# Patient Record
Sex: FEMALE | Race: WHITE | URBAN - METROPOLITAN AREA
[De-identification: names, ages, dates, MRNs, and addresses within clinical notes are randomized per-mention and may not be internally consistent; named-entity substitution may affect disease eponyms.]

---

## 2022-02-22 ENCOUNTER — OFFICE VISIT (OUTPATIENT)
Dept: URBAN - METROPOLITAN AREA TELEHEALTH 2 | Facility: TELEHEALTH | Age: 67
End: 2022-02-22
Payer: MEDICARE

## 2022-02-22 DIAGNOSIS — R19.5 LOOSE STOOLS: ICD-10-CM

## 2022-02-22 PROCEDURE — 99204 OFFICE O/P NEW MOD 45 MIN: CPT | Performed by: NURSE PRACTITIONER

## 2022-02-22 RX ORDER — NITROFURANTOIN (MONOHYDRATE/MACROCRYSTALS) 75; 25 MG/1; MG/1
NULL DIAGNOSIS UNAVAILABLE CAPSULE ORAL
Qty: 10 | Refills: 0 | Status: ACTIVE | COMMUNITY

## 2022-02-22 RX ORDER — CIPROFLOXACIN 500 MG/1
TABLET, FILM COATED ORAL
Qty: 14 | Refills: 0 | Status: ACTIVE | COMMUNITY

## 2022-02-22 RX ORDER — OMEPRAZOLE 40 MG/1
CAPSULE, DELAYED RELEASE ORAL
Qty: 90 | Status: ACTIVE | COMMUNITY

## 2022-02-22 RX ORDER — CEFDINIR 300 MG/1
CAPSULE ORAL
Qty: 10 | Status: ACTIVE | COMMUNITY

## 2022-02-22 RX ORDER — LEVOTHYROXINE SODIUM 75 UG/1
TABLET ORAL
Qty: 90 | Status: ACTIVE | COMMUNITY

## 2022-02-22 RX ORDER — AMLODIPINE BESYLATE 10 MG/1
TABLET ORAL
Qty: 90 | Refills: 0 | Status: ACTIVE | COMMUNITY

## 2022-02-22 RX ORDER — CELECOXIB 200 MG/1
CAPSULE ORAL
Qty: 90 | Status: ACTIVE | COMMUNITY

## 2022-02-22 RX ORDER — ALBUTEROL SULFATE 90 UG/1
INHALE 2 PUFFS INTO THE LUNGS EVERY 4 HOURS AS NEEDED FOR MUSCLE SPASMS DIAGNOSIS UNAVAILABLE AEROSOL, METERED RESPIRATORY (INHALATION)
Qty: 8.5 | Refills: 0 | Status: ACTIVE | COMMUNITY

## 2022-02-22 RX ORDER — ONDANSETRON 4 MG/1
TABLET, ORALLY DISINTEGRATING ORAL
Qty: 20 | Status: ACTIVE | COMMUNITY

## 2022-02-22 RX ORDER — COLESTIPOL HYDROCHLORIDE 1 G/1
2 TABLETS TABLET ORAL ONCE A DAY
Qty: 60 | OUTPATIENT
Start: 2022-02-22

## 2022-02-22 NOTE — HPI-TODAY'S VISIT:
Audrey is a 66-year-old female who presents with loose stool.  She states for the last 2 years, she has had issues with loose stool.  Does still have her gallbladder.  She states she has frequent UTIs since having her hysterectomy.  Every time she gets put on antibiotics for these, her loose stool resolves.  In time she is off antibiotics her loose stool returns.   for the most part, she has been on antibiotics about once a month for her UTIs.  She is seen Dr. Milligan in the past and sounds like there is some report of some possible pelvic floor dysfunction and she is undergoing physical therapy for this.  No pain.  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  Otherwise, she is healthy. Sb

## 2022-02-26 ENCOUNTER — LAB OUTSIDE AN ENCOUNTER (OUTPATIENT)
Dept: URBAN - NONMETROPOLITAN AREA CLINIC 2 | Facility: CLINIC | Age: 67
End: 2022-02-26

## 2022-02-28 ENCOUNTER — TELEPHONE ENCOUNTER (OUTPATIENT)
Dept: URBAN - METROPOLITAN AREA CLINIC 92 | Facility: CLINIC | Age: 67
End: 2022-02-28

## 2022-02-28 RX ORDER — COLESTIPOL HYDROCHLORIDE 1 G/1
2 TABLETS TABLET, FILM COATED ORAL ONCE A DAY
Qty: 60 | OUTPATIENT
Start: 2022-02-28

## 2022-03-03 LAB
CALPROTECTIN, STOOL - QDX: (no result)
FECAL FAT, QUALITATIVE: (no result)
GASTROINTESTINAL PATHOGEN: (no result)
PANCREATICELASTASE ELISA, STOOL: (no result)

## 2022-03-04 ENCOUNTER — TELEPHONE ENCOUNTER (OUTPATIENT)
Dept: URBAN - METROPOLITAN AREA CLINIC 92 | Facility: CLINIC | Age: 67
End: 2022-03-04

## 2022-03-18 ENCOUNTER — OFFICE VISIT (OUTPATIENT)
Dept: URBAN - NONMETROPOLITAN AREA CLINIC 2 | Facility: CLINIC | Age: 67
End: 2022-03-18

## 2022-04-21 ENCOUNTER — OFFICE VISIT (OUTPATIENT)
Dept: URBAN - NONMETROPOLITAN AREA CLINIC 2 | Facility: CLINIC | Age: 67
End: 2022-04-21

## 2022-04-24 ENCOUNTER — WEB ENCOUNTER (OUTPATIENT)
Dept: URBAN - NONMETROPOLITAN AREA CLINIC 13 | Facility: CLINIC | Age: 67
End: 2022-04-24

## 2022-04-25 ENCOUNTER — TELEPHONE ENCOUNTER (OUTPATIENT)
Dept: URBAN - METROPOLITAN AREA CLINIC 82 | Facility: CLINIC | Age: 67
End: 2022-04-25

## 2022-04-25 ENCOUNTER — OFFICE VISIT (OUTPATIENT)
Dept: URBAN - NONMETROPOLITAN AREA CLINIC 13 | Facility: CLINIC | Age: 67
End: 2022-04-25
Payer: MEDICARE

## 2022-04-25 VITALS
TEMPERATURE: 97.7 F | BODY MASS INDEX: 23.05 KG/M2 | SYSTOLIC BLOOD PRESSURE: 145 MMHG | DIASTOLIC BLOOD PRESSURE: 83 MMHG | HEIGHT: 64 IN | WEIGHT: 135 LBS | HEART RATE: 85 BPM

## 2022-04-25 DIAGNOSIS — R19.5 LOOSE STOOLS: ICD-10-CM

## 2022-04-25 DIAGNOSIS — K86.81 EXOCRINE PANCREATIC INSUFFICIENCY: ICD-10-CM

## 2022-04-25 PROCEDURE — 99213 OFFICE O/P EST LOW 20 MIN: CPT | Performed by: NURSE PRACTITIONER

## 2022-04-25 RX ORDER — NITROFURANTOIN (MONOHYDRATE/MACROCRYSTALS) 75; 25 MG/1; MG/1
NULL DIAGNOSIS UNAVAILABLE CAPSULE ORAL
Qty: 10 | Refills: 0 | Status: ACTIVE | COMMUNITY

## 2022-04-25 RX ORDER — LEVOTHYROXINE SODIUM 75 UG/1
TABLET ORAL
Qty: 90 | Status: ACTIVE | COMMUNITY

## 2022-04-25 RX ORDER — CELECOXIB 200 MG/1
CAPSULE ORAL
Qty: 90 | Status: ACTIVE | COMMUNITY

## 2022-04-25 RX ORDER — CIPROFLOXACIN 500 MG/1
TABLET, FILM COATED ORAL
Qty: 14 | Refills: 0 | Status: ACTIVE | COMMUNITY

## 2022-04-25 RX ORDER — ALBUTEROL SULFATE 90 UG/1
INHALE 2 PUFFS INTO THE LUNGS EVERY 4 HOURS AS NEEDED FOR MUSCLE SPASMS DIAGNOSIS UNAVAILABLE AEROSOL, METERED RESPIRATORY (INHALATION)
Qty: 8.5 | Refills: 0 | Status: ACTIVE | COMMUNITY

## 2022-04-25 RX ORDER — ONDANSETRON 4 MG/1
TABLET, ORALLY DISINTEGRATING ORAL
Qty: 20 | Status: ACTIVE | COMMUNITY

## 2022-04-25 RX ORDER — COLESTIPOL HYDROCHLORIDE 1 G/1
2 TABLETS TABLET, FILM COATED ORAL ONCE A DAY
Qty: 60 | Status: ACTIVE | COMMUNITY
Start: 2022-02-28

## 2022-04-25 RX ORDER — AMLODIPINE BESYLATE 10 MG/1
TABLET ORAL
Qty: 90 | Refills: 0 | Status: ACTIVE | COMMUNITY

## 2022-04-25 RX ORDER — CEFDINIR 300 MG/1
CAPSULE ORAL
Qty: 10 | Status: ACTIVE | COMMUNITY

## 2022-04-25 RX ORDER — PANCRELIPASE 36000; 180000; 114000 [USP'U]/1; [USP'U]/1; [USP'U]/1
AS DIRECTED CAPSULE, DELAYED RELEASE PELLETS ORAL
Qty: 250 | Refills: 6 | OUTPATIENT
Start: 2022-04-25 | End: 2022-11-20

## 2022-04-25 RX ORDER — COLESTIPOL HYDROCHLORIDE 1 G/1
2 TABLETS TABLET ORAL ONCE A DAY
Qty: 60 | OUTPATIENT

## 2022-04-25 RX ORDER — COLESTIPOL HYDROCHLORIDE 1 G/1
2 TABLETS TABLET ORAL ONCE A DAY
Qty: 60 | Status: ACTIVE | COMMUNITY
Start: 2022-02-22

## 2022-04-25 RX ORDER — OMEPRAZOLE 40 MG/1
CAPSULE, DELAYED RELEASE ORAL
Qty: 90 | Status: ACTIVE | COMMUNITY

## 2022-04-25 NOTE — HPI-TODAY'S VISIT:
Consult: Audrey is a 66-year-old female who presents with loose stool.  She states for the last 2 years, she has had issues with loose stool.  Does still have her gallbladder.  She states she has frequent UTIs since having her hysterectomy.  Every time she gets put on antibiotics for these, her loose stool resolves.  In time she is off antibiotics her loose stool returns.   for the most part, she has been on antibiotics about once a month for her UTIs.  She is seen Dr. Milligan in the past and sounds like there is some report of some possible pelvic floor dysfunction and she is undergoing physical therapy for this.  No pain.  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  Otherwise, she is healthy. Sb  Audrey returns for f/u of loose stool and newly dx EPI. Since starting colestid, as long as she takes this daily, she is no longer having loose stool. She did bring her records by, but I do not have them in the chart. SB

## 2022-06-30 ENCOUNTER — TELEPHONE ENCOUNTER (OUTPATIENT)
Dept: URBAN - METROPOLITAN AREA CLINIC 92 | Facility: CLINIC | Age: 67
End: 2022-06-30

## 2022-07-29 ENCOUNTER — TELEPHONE ENCOUNTER (OUTPATIENT)
Dept: URBAN - NONMETROPOLITAN AREA CLINIC 2 | Facility: CLINIC | Age: 67
End: 2022-07-29

## 2022-07-29 RX ORDER — PANCRELIPASE 36000; 180000; 114000 [USP'U]/1; [USP'U]/1; [USP'U]/1
AS DIRECTED CAPSULE, DELAYED RELEASE PELLETS ORAL
Qty: 250 | Refills: 6
Start: 2022-04-25 | End: 2023-02-24

## 2022-08-19 ENCOUNTER — LAB OUTSIDE AN ENCOUNTER (OUTPATIENT)
Dept: URBAN - NONMETROPOLITAN AREA CLINIC 2 | Facility: CLINIC | Age: 67
End: 2022-08-19

## 2022-08-19 LAB — CREATININE, SERUM: 0.78

## 2022-08-26 ENCOUNTER — WEB ENCOUNTER (OUTPATIENT)
Dept: URBAN - METROPOLITAN AREA CLINIC 92 | Facility: CLINIC | Age: 67
End: 2022-08-26

## 2022-08-26 ENCOUNTER — WEB ENCOUNTER (OUTPATIENT)
Dept: URBAN - NONMETROPOLITAN AREA CLINIC 2 | Facility: CLINIC | Age: 67
End: 2022-08-26

## 2022-09-09 ENCOUNTER — WEB ENCOUNTER (OUTPATIENT)
Dept: URBAN - NONMETROPOLITAN AREA CLINIC 2 | Facility: CLINIC | Age: 67
End: 2022-09-09

## 2022-09-09 RX ORDER — PANCRELIPASE 36000; 180000; 114000 [USP'U]/1; [USP'U]/1; [USP'U]/1
AS DIRECTED CAPSULE, DELAYED RELEASE PELLETS ORAL
Qty: 360 | Refills: 6

## 2022-09-12 ENCOUNTER — WEB ENCOUNTER (OUTPATIENT)
Dept: URBAN - NONMETROPOLITAN AREA CLINIC 13 | Facility: CLINIC | Age: 67
End: 2022-09-12

## 2022-10-25 ENCOUNTER — WEB ENCOUNTER (OUTPATIENT)
Dept: URBAN - NONMETROPOLITAN AREA CLINIC 2 | Facility: CLINIC | Age: 67
End: 2022-10-25

## 2022-10-28 ENCOUNTER — WEB ENCOUNTER (OUTPATIENT)
Dept: URBAN - NONMETROPOLITAN AREA CLINIC 2 | Facility: CLINIC | Age: 67
End: 2022-10-28

## 2022-10-28 ENCOUNTER — OFFICE VISIT (OUTPATIENT)
Dept: URBAN - NONMETROPOLITAN AREA CLINIC 2 | Facility: CLINIC | Age: 67
End: 2022-10-28
Payer: MEDICARE

## 2022-10-28 VITALS
DIASTOLIC BLOOD PRESSURE: 81 MMHG | BODY MASS INDEX: 22.2 KG/M2 | WEIGHT: 130 LBS | HEIGHT: 64 IN | SYSTOLIC BLOOD PRESSURE: 135 MMHG | TEMPERATURE: 97.4 F | HEART RATE: 94 BPM

## 2022-10-28 DIAGNOSIS — K86.81 EXOCRINE PANCREATIC INSUFFICIENCY: ICD-10-CM

## 2022-10-28 DIAGNOSIS — K52.9 CHRONIC DIARRHEA: ICD-10-CM

## 2022-10-28 PROCEDURE — 99214 OFFICE O/P EST MOD 30 MIN: CPT | Performed by: INTERNAL MEDICINE

## 2022-10-28 RX ORDER — NITROFURANTOIN (MONOHYDRATE/MACROCRYSTALS) 75; 25 MG/1; MG/1
NULL DIAGNOSIS UNAVAILABLE CAPSULE ORAL
Qty: 10 | Refills: 0 | Status: DISCONTINUED | COMMUNITY

## 2022-10-28 RX ORDER — LEVOTHYROXINE SODIUM 75 UG/1
TABLET ORAL
Qty: 90 | Status: ACTIVE | COMMUNITY

## 2022-10-28 RX ORDER — ALBUTEROL SULFATE 90 UG/1
INHALE 2 PUFFS INTO THE LUNGS EVERY 4 HOURS AS NEEDED FOR MUSCLE SPASMS DIAGNOSIS UNAVAILABLE AEROSOL, METERED RESPIRATORY (INHALATION)
Qty: 8.5 | Refills: 0 | Status: DISCONTINUED | COMMUNITY

## 2022-10-28 RX ORDER — PANCRELIPASE 36000; 180000; 114000 [USP'U]/1; [USP'U]/1; [USP'U]/1
AS DIRECTED CAPSULE, DELAYED RELEASE PELLETS ORAL
Qty: 250 | Refills: 6

## 2022-10-28 RX ORDER — COLESTIPOL HYDROCHLORIDE 1 G/1
2 TABLETS TABLET, FILM COATED ORAL ONCE A DAY
Qty: 60 | Status: ACTIVE | COMMUNITY
Start: 2022-02-28

## 2022-10-28 RX ORDER — COLESTIPOL HYDROCHLORIDE 1 G/1
2 TABLETS TABLET ORAL ONCE A DAY
Qty: 60 | Status: ACTIVE | COMMUNITY

## 2022-10-28 RX ORDER — AMLODIPINE BESYLATE 10 MG/1
TABLET ORAL
Qty: 90 | Refills: 0 | Status: DISCONTINUED | COMMUNITY

## 2022-10-28 RX ORDER — CELECOXIB 200 MG/1
CAPSULE ORAL
Qty: 90 | Status: ACTIVE | COMMUNITY

## 2022-10-28 RX ORDER — OMEPRAZOLE 40 MG/1
CAPSULE, DELAYED RELEASE ORAL
Qty: 90 | Status: ACTIVE | COMMUNITY

## 2022-10-28 RX ORDER — ONDANSETRON 4 MG/1
TABLET, ORALLY DISINTEGRATING ORAL
Qty: 20 | Status: DISCONTINUED | COMMUNITY

## 2022-10-28 RX ORDER — CIPROFLOXACIN 500 MG/1
TABLET, FILM COATED ORAL
Qty: 14 | Refills: 0 | Status: DISCONTINUED | COMMUNITY

## 2022-10-28 RX ORDER — AMLODIPINE BESYLATE 5 MG/1
1 TABLET TABLET ORAL ONCE A DAY
Status: ACTIVE | COMMUNITY

## 2022-10-28 RX ORDER — PANCRELIPASE 36000; 180000; 114000 [USP'U]/1; [USP'U]/1; [USP'U]/1
AS DIRECTED CAPSULE, DELAYED RELEASE PELLETS ORAL
Qty: 360 | Refills: 6 | Status: ACTIVE | COMMUNITY

## 2022-10-28 RX ORDER — CEFDINIR 300 MG/1
CAPSULE ORAL
Qty: 10 | Status: ACTIVE | COMMUNITY

## 2022-10-28 NOTE — HPI-TODAY'S VISIT:
10/28/22: Ms. Javier returns for follow-up of chronic diarrhea.  Since her last clinic visit, she has continued colestipol and Creon but is still having ~10 loose stools per day and has to take frequent breaks to have a BM at work.  She is curious as to why this occurs and wonders about other etiologies.  She has not had a colonoscopy to rule out microscopic colitis yet.  She has had EGD with Emiliano and screening colonoscopy with Dr. Holger Tsai.  4/25/22: Consult: Audrey is a 66-year-old female who presents with loose stool.  She states for the last 2 years, she has had issues with loose stool.  Does still have her gallbladder.  She states she has frequent UTIs since having her hysterectomy.  Every time she gets put on antibiotics for these, her loose stool resolves.  In time she is off antibiotics her loose stool returns.   for the most part, she has been on antibiotics about once a month for her UTIs.  She is seen Dr. Milligan in the past and sounds like there is some report of some possible pelvic floor dysfunction and she is undergoing physical therapy for this.  No pain.  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  Otherwise, she is healthy. Sb  Audrey returns for f/u of loose stool and newly dx EPI. Since starting colestid, as long as she takes this daily, she is no longer having loose stool. She did bring her records by, but I do not have them in the chart. SB

## 2022-10-31 PROBLEM — 236071009: Status: ACTIVE | Noted: 2022-10-31

## 2022-11-02 ENCOUNTER — WEB ENCOUNTER (OUTPATIENT)
Dept: URBAN - METROPOLITAN AREA CLINIC 92 | Facility: CLINIC | Age: 67
End: 2022-11-02

## 2022-11-02 LAB
IMMUNOGLOBULIN A: 175
INTERPRETATION: (no result)
TISSUE TRANSGLUTAMINASE AB, IGA: <1

## 2022-12-12 ENCOUNTER — WEB ENCOUNTER (OUTPATIENT)
Dept: URBAN - NONMETROPOLITAN AREA SURGERY CENTER 1 | Facility: SURGERY CENTER | Age: 67
End: 2022-12-12

## 2022-12-13 ENCOUNTER — OFFICE VISIT (OUTPATIENT)
Dept: URBAN - NONMETROPOLITAN AREA SURGERY CENTER 1 | Facility: SURGERY CENTER | Age: 67
End: 2022-12-13

## 2022-12-13 ENCOUNTER — CLAIMS CREATED FROM THE CLAIM WINDOW (OUTPATIENT)
Dept: URBAN - NONMETROPOLITAN AREA SURGERY CENTER 1 | Facility: SURGERY CENTER | Age: 67
End: 2022-12-13
Payer: MEDICARE

## 2022-12-13 ENCOUNTER — CLAIMS CREATED FROM THE CLAIM WINDOW (OUTPATIENT)
Dept: URBAN - METROPOLITAN AREA CLINIC 4 | Facility: CLINIC | Age: 67
End: 2022-12-13
Payer: MEDICARE

## 2022-12-13 DIAGNOSIS — K63.89 OTHER SPECIFIED DISEASES OF INTESTINE: ICD-10-CM

## 2022-12-13 DIAGNOSIS — D12.2 ADENOMA OF ASCENDING COLON: ICD-10-CM

## 2022-12-13 DIAGNOSIS — R19.7 ACUTE DIARRHEA: ICD-10-CM

## 2022-12-13 PROCEDURE — G8907 PT DOC NO EVENTS ON DISCHARG: HCPCS | Performed by: INTERNAL MEDICINE

## 2022-12-13 PROCEDURE — 88342 IMHCHEM/IMCYTCHM 1ST ANTB: CPT | Performed by: PATHOLOGY

## 2022-12-13 PROCEDURE — 45380 COLONOSCOPY AND BIOPSY: CPT | Performed by: INTERNAL MEDICINE

## 2022-12-13 PROCEDURE — 88313 SPECIAL STAINS GROUP 2: CPT | Performed by: PATHOLOGY

## 2022-12-13 PROCEDURE — 88305 TISSUE EXAM BY PATHOLOGIST: CPT | Performed by: PATHOLOGY

## 2022-12-13 PROCEDURE — 45385 COLONOSCOPY W/LESION REMOVAL: CPT | Performed by: INTERNAL MEDICINE

## 2022-12-13 RX ORDER — CELECOXIB 200 MG/1
CAPSULE ORAL
Qty: 90 | Status: ACTIVE | COMMUNITY

## 2022-12-13 RX ORDER — LEVOTHYROXINE SODIUM 75 UG/1
TABLET ORAL
Qty: 90 | Status: ACTIVE | COMMUNITY

## 2022-12-13 RX ORDER — AMLODIPINE BESYLATE 5 MG/1
1 TABLET TABLET ORAL ONCE A DAY
Status: ACTIVE | COMMUNITY

## 2022-12-13 RX ORDER — CEFDINIR 300 MG/1
CAPSULE ORAL
Qty: 10 | Status: ACTIVE | COMMUNITY

## 2022-12-13 RX ORDER — OMEPRAZOLE 40 MG/1
CAPSULE, DELAYED RELEASE ORAL
Qty: 90 | Status: ACTIVE | COMMUNITY

## 2022-12-13 RX ORDER — PANCRELIPASE 36000; 180000; 114000 [USP'U]/1; [USP'U]/1; [USP'U]/1
AS DIRECTED CAPSULE, DELAYED RELEASE PELLETS ORAL
Qty: 250 | Refills: 6 | Status: ACTIVE | COMMUNITY

## 2022-12-13 RX ORDER — COLESTIPOL HYDROCHLORIDE 1 G/1
2 TABLETS TABLET ORAL ONCE A DAY
Qty: 60 | Status: ACTIVE | COMMUNITY

## 2022-12-13 RX ORDER — COLESTIPOL HYDROCHLORIDE 1 G/1
2 TABLETS TABLET, FILM COATED ORAL ONCE A DAY
Qty: 60 | Status: ACTIVE | COMMUNITY
Start: 2022-02-28

## 2023-02-13 ENCOUNTER — TELEPHONE ENCOUNTER (OUTPATIENT)
Dept: URBAN - NONMETROPOLITAN AREA CLINIC 2 | Facility: CLINIC | Age: 68
End: 2023-02-13

## 2023-03-13 ENCOUNTER — OFFICE VISIT (OUTPATIENT)
Dept: URBAN - NONMETROPOLITAN AREA CLINIC 13 | Facility: CLINIC | Age: 68
End: 2023-03-13
Payer: MEDICARE

## 2023-03-13 VITALS
HEART RATE: 91 BPM | BODY MASS INDEX: 22.57 KG/M2 | DIASTOLIC BLOOD PRESSURE: 84 MMHG | TEMPERATURE: 98.3 F | SYSTOLIC BLOOD PRESSURE: 134 MMHG | WEIGHT: 132.2 LBS | HEIGHT: 64 IN

## 2023-03-13 DIAGNOSIS — K22.70 BARRETT'S ESOPHAGUS DETERMINED BY BIOPSY: ICD-10-CM

## 2023-03-13 DIAGNOSIS — R19.7 CHRONIC DIARRHEA: ICD-10-CM

## 2023-03-13 DIAGNOSIS — K86.81 EXOCRINE PANCREATIC INSUFFICIENCY: ICD-10-CM

## 2023-03-13 DIAGNOSIS — Z86.010 PERSONAL HISTORY OF COLONIC POLYPS: ICD-10-CM

## 2023-03-13 PROBLEM — 302914006: Status: ACTIVE | Noted: 2023-03-13

## 2023-03-13 PROBLEM — 428283002: Status: ACTIVE | Noted: 2023-03-13

## 2023-03-13 PROCEDURE — 99213 OFFICE O/P EST LOW 20 MIN: CPT | Performed by: NURSE PRACTITIONER

## 2023-03-13 RX ORDER — COLESTIPOL HYDROCHLORIDE 1 G/1
2 TABLETS TABLET, FILM COATED ORAL ONCE A DAY
Qty: 60 | Status: ACTIVE | COMMUNITY
Start: 2022-02-28

## 2023-03-13 RX ORDER — METRONIDAZOLE 250 MG/1
1 TABLET TABLET, FILM COATED ORAL
Qty: 56 TABLET | OUTPATIENT
Start: 2023-03-13 | End: 2023-03-27

## 2023-03-13 RX ORDER — LEVOTHYROXINE SODIUM 75 UG/1
TABLET ORAL
Qty: 90 | Status: ACTIVE | COMMUNITY

## 2023-03-13 RX ORDER — CELECOXIB 200 MG/1
CAPSULE ORAL
Qty: 90 | Status: ACTIVE | COMMUNITY

## 2023-03-13 RX ORDER — PANCRELIPASE 36000; 180000; 114000 [USP'U]/1; [USP'U]/1; [USP'U]/1
AS DIRECTED CAPSULE, DELAYED RELEASE PELLETS ORAL
Qty: 250 | Refills: 6 | Status: ON HOLD | COMMUNITY

## 2023-03-13 RX ORDER — OMEPRAZOLE 40 MG/1
CAPSULE, DELAYED RELEASE ORAL
Qty: 90 | Status: ACTIVE | COMMUNITY

## 2023-03-13 RX ORDER — AMLODIPINE BESYLATE 10 MG/1
1 TABLET TABLET ORAL ONCE A DAY
Status: ACTIVE | COMMUNITY

## 2023-03-13 RX ORDER — COLESTIPOL HYDROCHLORIDE 1 G/1
2 TABLETS TABLET ORAL ONCE A DAY
Qty: 60 | Status: ON HOLD | COMMUNITY

## 2023-03-13 RX ORDER — CEFDINIR 300 MG/1
CAPSULE ORAL
Qty: 10 | Status: ON HOLD | COMMUNITY

## 2023-03-13 NOTE — HPI-TODAY'S VISIT:
Consult: Audrey is a 66-year-old female who presents with loose stool.  She states for the last 2 years, she has had issues with loose stool.  Does still have her gallbladder.  She states she has frequent UTIs since having her hysterectomy.  Every time she gets put on antibiotics for these, her loose stool resolves.  In time she is off antibiotics her loose stool returns.   for the most part, she has been on antibiotics about once a month for her UTIs.  She is seen Dr. Diaz in the past and sounds like there is some report of some possible pelvic floor dysfunction and she is undergoing physical therapy for this.  No pain.  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  Otherwise, she is healthy. Sb    12/19/22 Colonoscopy 1 TA polyp, sig diverticulosis, neg random bx 10/22 Neg celiac serologies 2022 GPP neg, elastase 196 6/24/22 CT abd/pelvis- constipation, pancreas WNL

## 2023-03-14 ENCOUNTER — TELEPHONE ENCOUNTER (OUTPATIENT)
Dept: URBAN - NONMETROPOLITAN AREA CLINIC 2 | Facility: CLINIC | Age: 68
End: 2023-03-14

## 2023-03-14 RX ORDER — METRONIDAZOLE 250 MG/1
1 TABLET TABLET, FILM COATED ORAL
Qty: 56 TABLET
Start: 2023-03-13 | End: 2023-03-28

## 2023-03-31 ENCOUNTER — ERX REFILL RESPONSE (OUTPATIENT)
Dept: URBAN - NONMETROPOLITAN AREA CLINIC 2 | Facility: CLINIC | Age: 68
End: 2023-03-31

## 2023-03-31 ENCOUNTER — WEB ENCOUNTER (OUTPATIENT)
Dept: URBAN - NONMETROPOLITAN AREA CLINIC 13 | Facility: CLINIC | Age: 68
End: 2023-03-31

## 2023-03-31 ENCOUNTER — TELEPHONE ENCOUNTER (OUTPATIENT)
Dept: URBAN - NONMETROPOLITAN AREA CLINIC 2 | Facility: CLINIC | Age: 68
End: 2023-03-31

## 2023-03-31 RX ORDER — METRONIDAZOLE 250 MG/1
TAKE 1 TABLET BY MOUTH FOUR TIMES DAILY FOR 14 DAYS TABLET ORAL
Qty: 56 TABLET | Refills: 0 | OUTPATIENT

## 2023-03-31 RX ORDER — METRONIDAZOLE 250 MG/1
1 TABLET TABLET, FILM COATED ORAL
Qty: 56 TABLET
Start: 2023-03-13 | End: 2023-04-14

## 2023-06-05 ENCOUNTER — ERX REFILL RESPONSE (OUTPATIENT)
Dept: URBAN - NONMETROPOLITAN AREA CLINIC 2 | Facility: CLINIC | Age: 68
End: 2023-06-05

## 2023-06-05 RX ORDER — COLESTIPOL HYDROCHLORIDE 1 G/1
TAKE 2 TABLETS EVERY DAY TABLET, FILM COATED ORAL
Qty: 180 TABLET | Refills: 0 | OUTPATIENT

## 2023-06-05 RX ORDER — COLESTIPOL HYDROCHLORIDE 1 G/1
TAKE 2 TABLETS EVERY DAY TABLET, FILM COATED ORAL
Qty: 180 TABLET | Refills: 3 | OUTPATIENT

## 2023-07-17 ENCOUNTER — LAB OUTSIDE AN ENCOUNTER (OUTPATIENT)
Dept: URBAN - NONMETROPOLITAN AREA CLINIC 13 | Facility: CLINIC | Age: 68
End: 2023-07-17

## 2023-07-17 ENCOUNTER — OFFICE VISIT (OUTPATIENT)
Dept: URBAN - NONMETROPOLITAN AREA CLINIC 13 | Facility: CLINIC | Age: 68
End: 2023-07-17
Payer: MEDICARE

## 2023-07-17 VITALS
DIASTOLIC BLOOD PRESSURE: 81 MMHG | HEIGHT: 64 IN | TEMPERATURE: 97.7 F | WEIGHT: 128 LBS | BODY MASS INDEX: 21.85 KG/M2 | SYSTOLIC BLOOD PRESSURE: 130 MMHG | HEART RATE: 76 BPM

## 2023-07-17 DIAGNOSIS — K52.89 (LYMPHOCYTIC) MICROSCOPIC COLITIS: ICD-10-CM

## 2023-07-17 DIAGNOSIS — K22.70 BARRETT'S ESOPHAGUS DETERMINED BY BIOPSY: ICD-10-CM

## 2023-07-17 DIAGNOSIS — K92.1 MELENA: ICD-10-CM

## 2023-07-17 DIAGNOSIS — K86.1 ACUTE ON CHRONIC PANCREATITIS: ICD-10-CM

## 2023-07-17 PROCEDURE — 99214 OFFICE O/P EST MOD 30 MIN: CPT | Performed by: NURSE PRACTITIONER

## 2023-07-17 RX ORDER — PANCRELIPASE 36000; 180000; 114000 [USP'U]/1; [USP'U]/1; [USP'U]/1
AS DIRECTED CAPSULE, DELAYED RELEASE PELLETS ORAL
Qty: 250 | Refills: 6 | Status: ON HOLD | COMMUNITY

## 2023-07-17 RX ORDER — OMEPRAZOLE 40 MG/1
CAPSULE, DELAYED RELEASE ORAL
Qty: 90 | Status: ACTIVE | COMMUNITY

## 2023-07-17 RX ORDER — COLESTIPOL HYDROCHLORIDE 1 G/1
2 TABLETS TABLET ORAL ONCE A DAY
Qty: 60 | Status: ON HOLD | COMMUNITY

## 2023-07-17 RX ORDER — CEFDINIR 300 MG/1
CAPSULE ORAL
Qty: 10 | Status: ON HOLD | COMMUNITY

## 2023-07-17 RX ORDER — COLESTIPOL HYDROCHLORIDE 1 G/1
TAKE 2 TABLETS EVERY DAY TABLET, FILM COATED ORAL
Qty: 180 TABLET | Refills: 3 | Status: ACTIVE | COMMUNITY

## 2023-07-17 RX ORDER — METRONIDAZOLE 250 MG/1
TAKE 1 TABLET BY MOUTH FOUR TIMES DAILY FOR 14 DAYS TABLET ORAL
Qty: 56 TABLET | Refills: 0 | Status: ON HOLD | COMMUNITY

## 2023-07-17 RX ORDER — CELECOXIB 200 MG/1
CAPSULE ORAL
Qty: 90 | Status: ACTIVE | COMMUNITY

## 2023-07-17 RX ORDER — AMLODIPINE BESYLATE 10 MG/1
1 TABLET TABLET ORAL ONCE A DAY
Status: ACTIVE | COMMUNITY

## 2023-07-17 RX ORDER — LEVOTHYROXINE SODIUM 75 UG/1
TABLET ORAL
Qty: 90 | Status: ACTIVE | COMMUNITY

## 2023-07-17 NOTE — HPI-TODAY'S VISIT:
Consult: Audrey is a 66-year-old female who presents with loose stool.  She states for the last 2 years, she has had issues with loose stool.  Does still have her gallbladder.  She states she has frequent UTIs since having her hysterectomy.  Every time she gets put on antibiotics for these, her loose stool resolves.  In time she is off antibiotics her loose stool returns.   for the most part, she has been on antibiotics about once a month for her UTIs.  She is seen Dr. Diaz in the past and sounds like there is some report of some possible pelvic floor dysfunction and she is undergoing physical therapy for this.  No pain.  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  Otherwise, she is healthy. Sb  7/17/23 Today, audrey returns for f/u of loose stool. loose stool actually improved with flagyl. now, having numerous "ball like" stools daily, but not loose. recently started having some melena though.    12/19/22 Colonoscopy 1 TA polyp, sig diverticulosis, neg random bx 10/22 Neg celiac serologies 2022 GPP neg, elastase 196 6/24/22 CT abd/pelvis- constipation, pancreas WNL

## 2023-07-18 PROBLEM — 2901004: Status: ACTIVE | Noted: 2023-07-18

## 2023-08-31 ENCOUNTER — OFFICE VISIT (OUTPATIENT)
Dept: URBAN - NONMETROPOLITAN AREA SURGERY CENTER 1 | Facility: SURGERY CENTER | Age: 68
End: 2023-08-31
Payer: MEDICARE

## 2023-08-31 ENCOUNTER — CLAIMS CREATED FROM THE CLAIM WINDOW (OUTPATIENT)
Dept: URBAN - METROPOLITAN AREA CLINIC 4 | Facility: CLINIC | Age: 68
End: 2023-08-31
Payer: MEDICARE

## 2023-08-31 DIAGNOSIS — T47.8X5A ADVERSE EFFECT OF OTHER AGENTS PRIMARILY AFFECTING GASTROINTESTINAL SYSTEM, INITIAL ENCOUNTER: ICD-10-CM

## 2023-08-31 DIAGNOSIS — K29.70 GASTRITIS, UNSPECIFIED, WITHOUT BLEEDING: ICD-10-CM

## 2023-08-31 DIAGNOSIS — K29.60 ADENOPAPILLOMATOSIS GASTRICA: ICD-10-CM

## 2023-08-31 DIAGNOSIS — K22.70 BARRETT ESOPHAGUS: ICD-10-CM

## 2023-08-31 DIAGNOSIS — K31.89 OTHER DISEASES OF STOMACH AND DUODENUM: ICD-10-CM

## 2023-08-31 PROCEDURE — 88342 IMHCHEM/IMCYTCHM 1ST ANTB: CPT | Performed by: PATHOLOGY

## 2023-08-31 PROCEDURE — G8907 PT DOC NO EVENTS ON DISCHARG: HCPCS | Performed by: INTERNAL MEDICINE

## 2023-08-31 PROCEDURE — 88305 TISSUE EXAM BY PATHOLOGIST: CPT | Performed by: PATHOLOGY

## 2023-08-31 PROCEDURE — 43239 EGD BIOPSY SINGLE/MULTIPLE: CPT | Performed by: INTERNAL MEDICINE

## 2023-10-16 ENCOUNTER — CLAIMS CREATED FROM THE CLAIM WINDOW (OUTPATIENT)
Dept: URBAN - NONMETROPOLITAN AREA CLINIC 13 | Facility: CLINIC | Age: 68
End: 2023-10-16
Payer: MEDICARE

## 2023-10-16 ENCOUNTER — DASHBOARD ENCOUNTERS (OUTPATIENT)
Age: 68
End: 2023-10-16

## 2023-10-16 VITALS
SYSTOLIC BLOOD PRESSURE: 125 MMHG | BODY MASS INDEX: 22.2 KG/M2 | TEMPERATURE: 98 F | WEIGHT: 130 LBS | HEART RATE: 76 BPM | DIASTOLIC BLOOD PRESSURE: 73 MMHG | HEIGHT: 64 IN

## 2023-10-16 DIAGNOSIS — R19.7 ACUTE DIARRHEA: ICD-10-CM

## 2023-10-16 DIAGNOSIS — Z86.010 PERSONAL HISTORY OF COLONIC POLYPS: ICD-10-CM

## 2023-10-16 DIAGNOSIS — K22.70 BARRETT'S ESOPHAGUS DETERMINED BY BIOPSY: ICD-10-CM

## 2023-10-16 DIAGNOSIS — K86.1 ACUTE ON CHRONIC PANCREATITIS: ICD-10-CM

## 2023-10-16 DIAGNOSIS — K86.81 EXOCRINE PANCREATIC INSUFFICIENCY: ICD-10-CM

## 2023-10-16 PROCEDURE — 99213 OFFICE O/P EST LOW 20 MIN: CPT | Performed by: NURSE PRACTITIONER

## 2023-10-16 RX ORDER — FAMOTIDINE 20 MG/1
1 TABLET AT BEDTIME AS NEEDED TABLET, FILM COATED ORAL ONCE A DAY
Qty: 30 | Refills: 11 | OUTPATIENT
Start: 2023-10-16

## 2023-10-16 RX ORDER — PANCRELIPASE 36000; 180000; 114000 [USP'U]/1; [USP'U]/1; [USP'U]/1
AS DIRECTED CAPSULE, DELAYED RELEASE PELLETS ORAL
Qty: 250 | Refills: 6 | Status: ON HOLD | COMMUNITY

## 2023-10-16 RX ORDER — OMEPRAZOLE 40 MG/1
CAPSULE, DELAYED RELEASE ORAL
Qty: 90 | Status: ACTIVE | COMMUNITY

## 2023-10-16 RX ORDER — COLESTIPOL HYDROCHLORIDE 1 G/1
TAKE 2 TABLETS EVERY DAY TABLET, FILM COATED ORAL ONCE A DAY
Qty: 60 | Refills: 11

## 2023-10-16 RX ORDER — SACCHAROMYCES BOULARDII 50 MG
TWICE DAILY CAPSULE ORAL TWICE DAILY
Qty: 60 | Refills: 11 | OUTPATIENT
Start: 2023-10-16 | End: 2024-10-10

## 2023-10-16 RX ORDER — AMLODIPINE BESYLATE 10 MG/1
1 TABLET TABLET ORAL ONCE A DAY
Status: ACTIVE | COMMUNITY

## 2023-10-16 RX ORDER — CEFDINIR 300 MG/1
CAPSULE ORAL
Qty: 10 | Status: ON HOLD | COMMUNITY

## 2023-10-16 RX ORDER — COLESTIPOL HYDROCHLORIDE 1 G/1
2 TABLETS TABLET ORAL ONCE A DAY
Qty: 60 | Status: ON HOLD | COMMUNITY

## 2023-10-16 RX ORDER — CELECOXIB 200 MG/1
CAPSULE ORAL
Qty: 90 | Status: ACTIVE | COMMUNITY

## 2023-10-16 RX ORDER — COLESTIPOL HYDROCHLORIDE 1 G/1
TAKE 2 TABLETS EVERY DAY TABLET, FILM COATED ORAL
Qty: 180 TABLET | Refills: 3 | Status: ACTIVE | COMMUNITY

## 2023-10-16 RX ORDER — METRONIDAZOLE 250 MG/1
TAKE 1 TABLET BY MOUTH FOUR TIMES DAILY FOR 14 DAYS TABLET ORAL
Qty: 56 TABLET | Refills: 0 | Status: ON HOLD | COMMUNITY

## 2023-10-16 RX ORDER — LEVOTHYROXINE SODIUM 75 UG/1
TABLET ORAL
Qty: 90 | Status: ACTIVE | COMMUNITY

## 2023-10-16 NOTE — HPI-TODAY'S VISIT:
Consult: Audrey is a 66-year-old female who presents with loose stool.  She states for the last 2 years, she has had issues with loose stool.  Does still have her gallbladder.  She states she has frequent UTIs since having her hysterectomy.  Every time she gets put on antibiotics for these, her loose stool resolves.  In time she is off antibiotics her loose stool returns.   for the most part, she has been on antibiotics about once a month for her UTIs.  She is seen Dr. Diaz in the past and sounds like there is some report of some possible pelvic floor dysfunction and she is undergoing physical therapy for this.  No pain.  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  Otherwise, she is healthy. Sb  10/16/23 Today, Audrey returns for f/u of loose stool. loose stool actually improved with flagyl and colestid, but has currently been off of colestid for a few days and done well with the exception of eating cupcakes one day. some GERD HS resolved with famotidine. on omeprazole daily as well for barretts. sb  8/31/23 barretts 7 cm and small HH- repeat in 2 weeks 12/19/22 Colonoscopy 1 TA polyp, sig diverticulosis, neg random bx 10/22 Neg celiac serologies 2022 GPP neg, elastase 196 6/24/22 CT abd/pelvis- constipation, pancreas WNL

## 2024-08-26 ENCOUNTER — ERX REFILL RESPONSE (OUTPATIENT)
Dept: URBAN - NONMETROPOLITAN AREA CLINIC 2 | Facility: CLINIC | Age: 69
End: 2024-08-26

## 2024-08-26 RX ORDER — FAMOTIDINE 20 MG/1
TAKE 1 TABLET AT BEDTIME AS NEEDED TABLET, FILM COATED ORAL
Qty: 90 TABLET | Refills: 4 | OUTPATIENT

## 2024-08-26 RX ORDER — FAMOTIDINE 40 MG/1
1 TABLET AT BEDTIME TABLET, FILM COATED ORAL ONCE A DAY
Qty: 90 TABLET | Refills: 3 | OUTPATIENT

## 2024-10-14 ENCOUNTER — OFFICE VISIT (OUTPATIENT)
Dept: URBAN - NONMETROPOLITAN AREA CLINIC 13 | Facility: CLINIC | Age: 69
End: 2024-10-14
Payer: MEDICARE

## 2024-10-14 VITALS
SYSTOLIC BLOOD PRESSURE: 140 MMHG | DIASTOLIC BLOOD PRESSURE: 80 MMHG | WEIGHT: 130 LBS | HEART RATE: 85 BPM | HEIGHT: 64 IN | TEMPERATURE: 98.3 F | BODY MASS INDEX: 22.2 KG/M2

## 2024-10-14 DIAGNOSIS — K86.81 EXOCRINE PANCREATIC INSUFFICIENCY: ICD-10-CM

## 2024-10-14 DIAGNOSIS — Z86.0101 H/O ADENOMATOUS POLYP OF COLON: ICD-10-CM

## 2024-10-14 DIAGNOSIS — R19.7 ACUTE DIARRHEA: ICD-10-CM

## 2024-10-14 DIAGNOSIS — K22.70 BARRETT'S ESOPHAGUS DETERMINED BY BIOPSY: ICD-10-CM

## 2024-10-14 PROCEDURE — 99214 OFFICE O/P EST MOD 30 MIN: CPT | Performed by: NURSE PRACTITIONER

## 2024-10-14 RX ORDER — METRONIDAZOLE 250 MG/1
TAKE 1 TABLET BY MOUTH FOUR TIMES DAILY FOR 14 DAYS TABLET ORAL
Qty: 56 TABLET | Refills: 0 | Status: ON HOLD | COMMUNITY

## 2024-10-14 RX ORDER — LEVOTHYROXINE SODIUM 75 UG/1
TABLET ORAL
Qty: 90 | Status: ACTIVE | COMMUNITY

## 2024-10-14 RX ORDER — COLESTIPOL HYDROCHLORIDE 1 G/1
2 TABLETS TABLET ORAL ONCE A DAY
Qty: 60 | Status: ON HOLD | COMMUNITY

## 2024-10-14 RX ORDER — FAMOTIDINE 20 MG/1
1 TABLET AT BEDTIME AS NEEDED TABLET, FILM COATED ORAL ONCE A DAY
Qty: 30 | Refills: 11 | Status: ACTIVE | COMMUNITY
Start: 2023-10-16

## 2024-10-14 RX ORDER — COLESTIPOL HYDROCHLORIDE 1 G/1
TAKE 2 TABLETS EVERY DAY TABLET, FILM COATED ORAL ONCE A DAY
Qty: 60 | Refills: 11 | Status: ACTIVE | COMMUNITY

## 2024-10-14 RX ORDER — CELECOXIB 200 MG/1
CAPSULE ORAL
Qty: 90 | Status: ACTIVE | COMMUNITY

## 2024-10-14 RX ORDER — OMEPRAZOLE 40 MG/1
CAPSULE, DELAYED RELEASE ORAL
Qty: 90 | Status: ACTIVE | COMMUNITY

## 2024-10-14 RX ORDER — AMLODIPINE BESYLATE 10 MG/1
1 TABLET TABLET ORAL ONCE A DAY
Status: ACTIVE | COMMUNITY

## 2024-10-14 RX ORDER — PANCRELIPASE 36000; 180000; 114000 [USP'U]/1; [USP'U]/1; [USP'U]/1
AS DIRECTED CAPSULE, DELAYED RELEASE PELLETS ORAL
Qty: 250 | Refills: 6 | Status: ON HOLD | COMMUNITY

## 2024-10-14 RX ORDER — FAMOTIDINE 40 MG/1
1 TABLET AT BEDTIME TABLET, FILM COATED ORAL ONCE A DAY
Qty: 90 TABLET | Refills: 3 | Status: ACTIVE | COMMUNITY

## 2024-10-14 RX ORDER — CEFDINIR 300 MG/1
CAPSULE ORAL
Qty: 10 | Status: ON HOLD | COMMUNITY

## 2024-10-14 NOTE — HPI-TODAY'S VISIT:
Consult: Audrey is a 66-year-old female who presents with loose stool.  She states for the last 2 years, she has had issues with loose stool.  Does still have her gallbladder.  She states she has frequent UTIs since having her hysterectomy.  Every time she gets put on antibiotics for these, her loose stool resolves.  In time she is off antibiotics her loose stool returns.   for the most part, she has been on antibiotics about once a month for her UTIs.  She is seen Dr. Diaz in the past and sounds like there is some report of some possible pelvic floor dysfunction and she is undergoing physical therapy for this.  No pain.  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  Otherwise, she is healthy. Sb  10/14/24 Today, Audrey returns for f/u of loose stool. loose stool actually improved with flagyl and colestid, but has currently been off of colestid for the last year or so. has regular, but small soft stools. on omeprazole daily as well for barretts. may use famotidine if she eats too late. 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 sb  8/31/23 barretts 7 cm and small HH- repeat in 2 weeks 12/19/22 Colonoscopy 1 TA polyp, sig diverticulosis, neg random bx 10/22 Neg celiac serologies 2022 GPP neg, elastase 196 6/24/22 CT abd/pelvis- constipation, pancreas WNL

## 2025-01-30 ENCOUNTER — TELEPHONE ENCOUNTER (OUTPATIENT)
Dept: URBAN - NONMETROPOLITAN AREA CLINIC 2 | Facility: CLINIC | Age: 70
End: 2025-01-30

## 2025-01-30 RX ORDER — COLESTIPOL HYDROCHLORIDE 1 G/1
TAKE 2 TABLETS EVERY DAY TABLET, FILM COATED ORAL ONCE A DAY
Qty: 180 | Refills: 11

## 2025-02-06 ENCOUNTER — TELEPHONE ENCOUNTER (OUTPATIENT)
Dept: URBAN - NONMETROPOLITAN AREA CLINIC 2 | Facility: CLINIC | Age: 70
End: 2025-02-06

## 2025-02-06 RX ORDER — COLESTIPOL HYDROCHLORIDE 1 G/1
TAKE 2 TABLETS EVERY DAY TABLET, FILM COATED ORAL ONCE A DAY
Qty: 180 | Refills: 11

## 2025-07-28 ENCOUNTER — TELEPHONE ENCOUNTER (OUTPATIENT)
Dept: URBAN - NONMETROPOLITAN AREA CLINIC 13 | Facility: CLINIC | Age: 70
End: 2025-07-28